# Patient Record
Sex: FEMALE | Race: WHITE | Employment: UNEMPLOYED | URBAN - METROPOLITAN AREA
[De-identification: names, ages, dates, MRNs, and addresses within clinical notes are randomized per-mention and may not be internally consistent; named-entity substitution may affect disease eponyms.]

---

## 2017-09-01 ENCOUNTER — OFFICE VISIT (OUTPATIENT)
Dept: FAMILY MEDICINE CLINIC | Facility: CLINIC | Age: 27
End: 2017-09-01

## 2017-09-01 VITALS
DIASTOLIC BLOOD PRESSURE: 82 MMHG | SYSTOLIC BLOOD PRESSURE: 124 MMHG | HEART RATE: 102 BPM | OXYGEN SATURATION: 98 % | TEMPERATURE: 99 F

## 2017-09-01 DIAGNOSIS — L30.1 DYSHIDROTIC DERMATITIS: Primary | ICD-10-CM

## 2017-09-01 DIAGNOSIS — D22.30 ATYPICAL NEVUS OF FACE: ICD-10-CM

## 2017-09-01 DIAGNOSIS — L74.512 HYPERHIDROSIS OF PALMS AND SOLES: ICD-10-CM

## 2017-09-01 DIAGNOSIS — L74.513 HYPERHIDROSIS OF PALMS AND SOLES: ICD-10-CM

## 2017-09-01 DIAGNOSIS — L71.9 ROSACEA: Chronic | ICD-10-CM

## 2017-09-01 PROCEDURE — 99213 OFFICE O/P EST LOW 20 MIN: CPT | Performed by: FAMILY MEDICINE

## 2017-09-01 RX ORDER — BETAMETHASONE DIPROPIONATE 0.5 MG/G
1 CREAM TOPICAL 2 TIMES DAILY
Qty: 45 G | Refills: 2 | Status: SHIPPED | OUTPATIENT
Start: 2017-09-01 | End: 2019-05-30

## 2017-09-01 RX ORDER — LORATADINE 10 MG/1
TABLET ORAL
Qty: 90 TABLET | Refills: 0 | Status: SHIPPED | OUTPATIENT
Start: 2017-09-01

## 2017-09-01 RX ORDER — DOXYCYCLINE HYCLATE 50 MG/1
50 CAPSULE ORAL 2 TIMES DAILY
Qty: 180 CAPSULE | Refills: 1 | Status: SHIPPED | OUTPATIENT
Start: 2017-09-01 | End: 2018-08-30

## 2017-09-01 RX ORDER — DOXYCYCLINE HYCLATE 50 MG/1
CAPSULE ORAL
COMMUNITY
Start: 2017-06-09 | End: 2017-09-01

## 2017-09-01 NOTE — PROGRESS NOTES
HPI:  Patient presents with:  Rash: B feet for 1 week itchy  Derm Problem: mole lower lip (R)      Mary Victor is a 27year old female who presents with complains of rash of bilateral soles of feet    Duration: 1 week  It is described as itchy, red, palpitations  LUNG:  No SOB, cough or wheeze  GI:  No abdominal pain.   No N/V/D/C  MS:  No joint pain or swelling B  NEURO:  Denies numbness or tingling or weakness  PSYCH:  No mood concerns    EXAM:  /82   Pulse 102   Temp 98.6 °F (37 °C) (Oral)   S dipropionate 0.05 % External Cream 45 g 2      Sig: Apply 1 Application topically 2 (two) times daily.  X 1-2 weeks then prn      loratadine 10 MG Oral Tab 90 tablet 0      Si/2-1 tab po qd      Doxycycline Hyclate 50 MG Oral Cap 180 capsule 1      Sig:

## 2017-09-21 ENCOUNTER — TELEPHONE (OUTPATIENT)
Dept: FAMILY MEDICINE CLINIC | Facility: CLINIC | Age: 27
End: 2017-09-21

## 2017-09-21 NOTE — TELEPHONE ENCOUNTER
LOV 09/01/17 hyperhidrosis of palms and soles. Mom calling, states pt continues to have rash on soles of feet, denies itchiness/pain. States sores appear to be healing; however, she is noticing a \"pitting\" where the blisters were.   Pt used prescribed

## 2017-10-23 NOTE — TELEPHONE ENCOUNTER
Rcvd fax from [facility], requesting refill for:    Norethindrone-Eth Estradiol (nortel 1/35, 28) 1-35 mg-mcg oral tab  Sig: Take 1 tablet PO QD  Qty: 84     LOV 9/1/17, dermatology issue  No future appt schedule   Last pap-unknown

## 2017-12-10 PROBLEM — L70.0 ACNE VULGARIS: Status: ACTIVE | Noted: 2017-12-10

## 2017-12-10 PROBLEM — J30.89 CHRONIC NON-SEASONAL ALLERGIC RHINITIS: Status: ACTIVE | Noted: 2017-12-10

## 2017-12-13 ENCOUNTER — LAB ENCOUNTER (OUTPATIENT)
Dept: LAB | Age: 27
End: 2017-12-13
Attending: FAMILY MEDICINE
Payer: MEDICARE

## 2017-12-13 ENCOUNTER — OFFICE VISIT (OUTPATIENT)
Dept: FAMILY MEDICINE CLINIC | Facility: CLINIC | Age: 27
End: 2017-12-13

## 2017-12-13 VITALS
HEART RATE: 120 BPM | TEMPERATURE: 99 F | DIASTOLIC BLOOD PRESSURE: 78 MMHG | SYSTOLIC BLOOD PRESSURE: 124 MMHG | OXYGEN SATURATION: 98 %

## 2017-12-13 DIAGNOSIS — R23.8 VESICLES: ICD-10-CM

## 2017-12-13 DIAGNOSIS — L74.512 HYPERHIDROSIS OF PALMS AND SOLES: ICD-10-CM

## 2017-12-13 DIAGNOSIS — Z00.00 ROUTINE MEDICAL EXAM: Primary | ICD-10-CM

## 2017-12-13 DIAGNOSIS — E78.5 DYSLIPIDEMIA: ICD-10-CM

## 2017-12-13 DIAGNOSIS — L74.513 HYPERHIDROSIS OF PALMS AND SOLES: ICD-10-CM

## 2017-12-13 DIAGNOSIS — L70.0 ACNE VULGARIS: ICD-10-CM

## 2017-12-13 PROCEDURE — 84443 ASSAY THYROID STIM HORMONE: CPT

## 2017-12-13 PROCEDURE — 80061 LIPID PANEL: CPT

## 2017-12-13 PROCEDURE — 36415 COLL VENOUS BLD VENIPUNCTURE: CPT

## 2017-12-13 PROCEDURE — 86695 HERPES SIMPLEX TYPE 1 TEST: CPT

## 2017-12-13 PROCEDURE — G0438 PPPS, INITIAL VISIT: HCPCS | Performed by: FAMILY MEDICINE

## 2017-12-13 PROCEDURE — G0008 ADMIN INFLUENZA VIRUS VAC: HCPCS | Performed by: FAMILY MEDICINE

## 2017-12-13 PROCEDURE — 80053 COMPREHEN METABOLIC PANEL: CPT

## 2017-12-13 PROCEDURE — 86694 HERPES SIMPLEX NES ANTBDY: CPT

## 2017-12-13 PROCEDURE — 86696 HERPES SIMPLEX TYPE 2 TEST: CPT

## 2017-12-13 PROCEDURE — 85025 COMPLETE CBC W/AUTO DIFF WBC: CPT

## 2017-12-13 PROCEDURE — 90686 IIV4 VACC NO PRSV 0.5 ML IM: CPT | Performed by: FAMILY MEDICINE

## 2017-12-13 NOTE — H&P
HPI:   Patient presents with:  Physical  Derm Problem  Acne      Shanika Welch is a 32year old female who presents for a complete physical exam without pap/gyne exam.     Patient has new complaints of:  No new complaints, overall hyperhidrosis of f positives and negatives noted in the the HPI.  Specifically:  GEN:  No fever or fatigue, no weight gain/loss  HEAD:  No headaches, no dizziness  EYES:  No vision change or complaints  EARS:  No hearing loss, no ear pain  MOUTH/THROAT:  No sore throat or den Discussed importance of exercise and healthy well balanced diet. Recommend low fat DASH diet and aerobic exercise 30 minutes 3-4 times weekly.     Health maintenance:   · At 23 y/o: Thin prep PAP q 2-3 years if h/o NL PAP, deferred  · Recommend B screenin

## 2017-12-18 RX ORDER — VALACYCLOVIR HYDROCHLORIDE 1 G/1
TABLET, FILM COATED ORAL
Qty: 8 TABLET | Refills: 1 | Status: SHIPPED | OUTPATIENT
Start: 2017-12-18 | End: 2018-06-29

## 2018-04-16 RX ORDER — NORETHINDRONE AND ETHINYL ESTRADIOL 1 MG-35MCG
KIT ORAL
Qty: 84 TABLET | Refills: 1 | Status: SHIPPED | OUTPATIENT
Start: 2018-04-16 | End: 2018-09-27

## 2018-04-16 NOTE — TELEPHONE ENCOUNTER
Clemencia David [address]  Requests refill for:    Dasetta 1/35 1-35 mg-mcg oral tab (Pirmella 1/35 tab)  Sig: Take 1 tablet PO QD  Qty 84  RF 1    LOV 12/13/17 CPE w/o gyne; f/u 1-2 years CPE  PAP deferred at this visit

## 2018-06-28 ENCOUNTER — TELEPHONE (OUTPATIENT)
Dept: FAMILY MEDICINE CLINIC | Facility: CLINIC | Age: 28
End: 2018-06-28

## 2018-06-28 RX ORDER — VALACYCLOVIR HYDROCHLORIDE 1 G/1
TABLET, FILM COATED ORAL
Qty: 8 TABLET | Refills: 1 | Status: CANCELLED | OUTPATIENT
Start: 2018-06-28

## 2018-06-28 NOTE — TELEPHONE ENCOUNTER
Copy and pasted from mom's chart Comfort Saini):    \"Good evening Dr. cordova  this is Comfort Saini I’m reaching out to you because Brittney Sarkar has a really bad outbreak on her feet again from her herp Virus.  I filled her valtex  which was prescribed at tw

## 2018-06-29 ENCOUNTER — TELEPHONE (OUTPATIENT)
Dept: FAMILY MEDICINE CLINIC | Facility: CLINIC | Age: 28
End: 2018-06-29

## 2018-06-29 RX ORDER — VALACYCLOVIR HYDROCHLORIDE 1 G/1
TABLET, FILM COATED ORAL
Qty: 8 TABLET | Refills: 1 | Status: SHIPPED | OUTPATIENT
Start: 2018-06-29 | End: 2019-01-18

## 2018-06-29 NOTE — TELEPHONE ENCOUNTER
Spoke to mom, states pt was given valacyclovir 1 tab on 6/25/18 and 6/26/18; pt's breakout has improved but continues to have areas which have not cleared. Would like to know if she should have an additional dose?   Mom made an appt to come in to be evaluat

## 2018-08-30 DIAGNOSIS — L71.9 ROSACEA: Chronic | ICD-10-CM

## 2018-08-30 RX ORDER — DOXYCYCLINE HYCLATE 50 MG/1
CAPSULE ORAL
Qty: 180 CAPSULE | Refills: 0 | Status: SHIPPED | OUTPATIENT
Start: 2018-08-30 | End: 2019-01-18

## 2018-08-30 NOTE — TELEPHONE ENCOUNTER
A refill request was received for:    Pending Prescriptions Disp Refills    DOXYCYCLINE HYCLATE 50 MG Oral Cap [Pharmacy Med Name: DOXYCYCLINE HYC 50MG CAP] 180 capsule 1     Sig: TAKE ONE CAPSULE BY MOUTH TWICE DAILY         Last refill date: 9/1/17  Qty:

## 2018-09-28 RX ORDER — NORETHINDRONE AND ETHINYL ESTRADIOL 1 MG-35MCG
KIT ORAL
Qty: 84 TABLET | Refills: 0 | Status: SHIPPED | OUTPATIENT
Start: 2018-09-28 | End: 2018-12-13

## 2018-12-13 RX ORDER — NORETHINDRONE AND ETHINYL ESTRADIOL 1 MG-35MCG
KIT ORAL
Qty: 84 TABLET | Refills: 1 | Status: SHIPPED | OUTPATIENT
Start: 2018-12-13 | End: 2019-01-18

## 2018-12-13 NOTE — TELEPHONE ENCOUNTER
A refill request was received for:  Requested Prescriptions     Pending Prescriptions Disp Refills   • [facility] 1/35 1-35 MG-MCG Oral Tab [Pharmacy Med Name: [facility] 1/35 TAB] 84 tablet 0     Sig: TAKE 1 TABLET BY MOUTH ONCE DAILY     Last refill date: 9/

## 2019-01-18 ENCOUNTER — OFFICE VISIT (OUTPATIENT)
Dept: FAMILY MEDICINE CLINIC | Facility: CLINIC | Age: 29
End: 2019-01-18
Payer: MEDICARE

## 2019-01-18 ENCOUNTER — LAB ENCOUNTER (OUTPATIENT)
Dept: LAB | Facility: REFERENCE LAB | Age: 29
End: 2019-01-18
Attending: FAMILY MEDICINE
Payer: MEDICARE

## 2019-01-18 VITALS
RESPIRATION RATE: 17 BRPM | DIASTOLIC BLOOD PRESSURE: 62 MMHG | SYSTOLIC BLOOD PRESSURE: 108 MMHG | OXYGEN SATURATION: 98 % | HEART RATE: 115 BPM | WEIGHT: 93.19 LBS

## 2019-01-18 DIAGNOSIS — L74.513 HYPERHIDROSIS OF PALMS AND SOLES: ICD-10-CM

## 2019-01-18 DIAGNOSIS — L71.9 ROSACEA: ICD-10-CM

## 2019-01-18 DIAGNOSIS — Z00.00 ROUTINE MEDICAL EXAM: Primary | ICD-10-CM

## 2019-01-18 DIAGNOSIS — E78.5 DYSLIPIDEMIA: ICD-10-CM

## 2019-01-18 DIAGNOSIS — B00.9 RECURRENT HERPES SIMPLEX: ICD-10-CM

## 2019-01-18 DIAGNOSIS — G80.2 SPASTIC HEMIPLEGIC CEREBRAL PALSY (HCC): ICD-10-CM

## 2019-01-18 DIAGNOSIS — L74.512 HYPERHIDROSIS OF PALMS AND SOLES: ICD-10-CM

## 2019-01-18 LAB
ALBUMIN SERPL BCP-MCNC: 4.4 G/DL (ref 3.5–4.8)
ALBUMIN/GLOB SERPL: 1.3 {RATIO} (ref 1–2)
ALP SERPL-CCNC: 41 U/L (ref 32–100)
ALT SERPL-CCNC: 23 U/L (ref 14–54)
ANION GAP SERPL CALC-SCNC: 14 MMOL/L (ref 0–18)
AST SERPL-CCNC: 23 U/L (ref 15–41)
BASOPHILS # BLD: 0 K/UL (ref 0–0.2)
BASOPHILS NFR BLD: 1 %
BILIRUB SERPL-MCNC: 0.7 MG/DL (ref 0.3–1.2)
BUN SERPL-MCNC: 7 MG/DL (ref 8–20)
BUN/CREAT SERPL: 14 (ref 10–20)
CALCIUM SERPL-MCNC: 9.3 MG/DL (ref 8.5–10.5)
CHLORIDE SERPL-SCNC: 103 MMOL/L (ref 95–110)
CHOLEST SERPL-MCNC: 255 MG/DL (ref 110–200)
CO2 SERPL-SCNC: 19 MMOL/L (ref 22–32)
CREAT SERPL-MCNC: 0.5 MG/DL (ref 0.5–1.5)
EOSINOPHIL # BLD: 0.1 K/UL (ref 0–0.7)
EOSINOPHIL NFR BLD: 2 %
ERYTHROCYTE [DISTWIDTH] IN BLOOD BY AUTOMATED COUNT: 12.8 % (ref 11–15)
GLOBULIN PLAS-MCNC: 3.4 G/DL (ref 2.5–3.7)
GLUCOSE SERPL-MCNC: 83 MG/DL (ref 70–99)
HCT VFR BLD AUTO: 43.2 % (ref 35–48)
HDLC SERPL-MCNC: 67 MG/DL
HGB BLD-MCNC: 14.8 G/DL (ref 12–16)
LDLC SERPL CALC-MCNC: 168 MG/DL (ref 0–99)
LYMPHOCYTES # BLD: 2.1 K/UL (ref 1–4)
LYMPHOCYTES NFR BLD: 32 %
MCH RBC QN AUTO: 29.8 PG (ref 27–32)
MCHC RBC AUTO-ENTMCNC: 34.2 G/DL (ref 32–37)
MCV RBC AUTO: 87.1 FL (ref 80–100)
MONOCYTES # BLD: 0.4 K/UL (ref 0–1)
MONOCYTES NFR BLD: 6 %
NEUTROPHILS # BLD AUTO: 3.9 K/UL (ref 1.8–7.7)
NEUTROPHILS NFR BLD: 59 %
NONHDLC SERPL-MCNC: 188 MG/DL
OSMOLALITY UR CALC.SUM OF ELEC: 279 MOSM/KG (ref 275–295)
PATIENT FASTING: YES
PLATELET # BLD AUTO: 383 K/UL (ref 140–400)
PMV BLD AUTO: 8.2 FL (ref 7.4–10.3)
POTASSIUM SERPL-SCNC: 4.2 MMOL/L (ref 3.3–5.1)
PROT SERPL-MCNC: 7.8 G/DL (ref 5.9–8.4)
RBC # BLD AUTO: 4.96 M/UL (ref 3.7–5.4)
SODIUM SERPL-SCNC: 136 MMOL/L (ref 136–144)
TRIGL SERPL-MCNC: 98 MG/DL (ref 1–149)
WBC # BLD AUTO: 6.6 K/UL (ref 4–11)

## 2019-01-18 PROCEDURE — 90686 IIV4 VACC NO PRSV 0.5 ML IM: CPT | Performed by: FAMILY MEDICINE

## 2019-01-18 PROCEDURE — 85025 COMPLETE CBC W/AUTO DIFF WBC: CPT

## 2019-01-18 PROCEDURE — 36415 COLL VENOUS BLD VENIPUNCTURE: CPT

## 2019-01-18 PROCEDURE — 90472 IMMUNIZATION ADMIN EACH ADD: CPT | Performed by: FAMILY MEDICINE

## 2019-01-18 PROCEDURE — 90715 TDAP VACCINE 7 YRS/> IM: CPT | Performed by: FAMILY MEDICINE

## 2019-01-18 PROCEDURE — G0439 PPPS, SUBSEQ VISIT: HCPCS | Performed by: FAMILY MEDICINE

## 2019-01-18 PROCEDURE — 80061 LIPID PANEL: CPT

## 2019-01-18 PROCEDURE — G0008 ADMIN INFLUENZA VIRUS VAC: HCPCS | Performed by: FAMILY MEDICINE

## 2019-01-18 PROCEDURE — 80053 COMPREHEN METABOLIC PANEL: CPT

## 2019-01-18 RX ORDER — VALACYCLOVIR HYDROCHLORIDE 1 G/1
TABLET, FILM COATED ORAL
Qty: 8 TABLET | Refills: 2 | Status: SHIPPED | OUTPATIENT
Start: 2019-01-18 | End: 2020-07-22

## 2019-01-18 RX ORDER — DOXYCYCLINE HYCLATE 50 MG/1
50 CAPSULE ORAL 2 TIMES DAILY
Qty: 180 CAPSULE | Refills: 0 | Status: SHIPPED | OUTPATIENT
Start: 2019-01-18 | End: 2019-07-30

## 2019-01-18 NOTE — H&P
HPI:   Patient presents with:  Physical: Annual Wellness Visit  Derm Problem      Lisbet Chandler is a 25year old female who presents for a complete physical exam without pap/gyne exam.     Patient has new complaints of:  · none    She denies CP, HAs prn Disp: 45 g Rfl: 2      Past Medical History:   Diagnosis Date   • Cerebral palsy ([facility] [address]    • Seasonal allergies       History reviewed. No pertinent surgical history. History reviewed. No pertinent family history.   No Known Allergies   Social History: no rales or rhonchi B   CARDIO: RRR without murmur, NL S1 S2, no S3 S4  GI: NABS, soft, nodistended, no masses, no HSM or tenderness  EXTREMITIES: no CCE,  brachial, DP, PT pulses wnl B  MS: In wheelchair, no change since last exam  PSYCH: mood and affect Refill: 2    5. Spastic hemiplegic cerebral palsy (HCC)  Stable    6. Dyslipidemia  Not on medication, recheck lipids, follow-up pending results  - CBC WITH DIFFERENTIAL WITH PLATELET; Future  - COMP METABOLIC PANEL (14); Future  - LIPID PANEL;  Future

## 2019-05-30 DIAGNOSIS — L30.1 DYSHIDROTIC DERMATITIS: ICD-10-CM

## 2019-05-30 DIAGNOSIS — L74.513 HYPERHIDROSIS OF PALMS AND SOLES: ICD-10-CM

## 2019-05-30 DIAGNOSIS — L74.512 HYPERHIDROSIS OF PALMS AND SOLES: ICD-10-CM

## 2019-05-30 RX ORDER — BETAMETHASONE DIPROPIONATE 0.5 MG/G
CREAM TOPICAL
Qty: 45 G | Refills: 1 | Status: SHIPPED | OUTPATIENT
Start: 2019-05-30

## 2019-05-30 NOTE — TELEPHONE ENCOUNTER
A refill request was received for:  Requested Prescriptions     Pending Prescriptions Disp Refills   • BETAMETHASONE DIPROPIONATE 0.05 % External Cream [Pharmacy Med Name: Geoffrey Cavazos DIP 0.05%  CRE]  2     Sig: APPLY  CREAM TOPICALLY TO AFFECTED AREA TWICE D

## 2019-07-30 DIAGNOSIS — L71.9 ROSACEA: ICD-10-CM

## 2019-07-30 NOTE — TELEPHONE ENCOUNTER
A refill request was received for:  Requested Prescriptions     Pending Prescriptions Disp Refills   • DOXYCYCLINE HYCLATE 50 MG Oral Cap [Pharmacy Med Name: DOXYCYCLINE HYCLATE 50MG CAP] 180 capsule 0     Sig: TAKE 1 CAPSULE BY MOUTH TWICE DAILY     Last

## 2019-08-01 RX ORDER — DOXYCYCLINE HYCLATE 50 MG/1
CAPSULE ORAL
Qty: 180 CAPSULE | Refills: 0 | Status: SHIPPED | OUTPATIENT
Start: 2019-08-01 | End: 2019-11-29

## 2019-09-18 ENCOUNTER — OFFICE VISIT (OUTPATIENT)
Dept: FAMILY MEDICINE CLINIC | Facility: CLINIC | Age: 29
End: 2019-09-18
Payer: MEDICARE

## 2019-09-18 VITALS — TEMPERATURE: 98 F | HEART RATE: 103 BPM | DIASTOLIC BLOOD PRESSURE: 82 MMHG | SYSTOLIC BLOOD PRESSURE: 126 MMHG

## 2019-09-18 DIAGNOSIS — G80.2 SPASTIC HEMIPLEGIC CEREBRAL PALSY (HCC): ICD-10-CM

## 2019-09-18 DIAGNOSIS — L74.512 HYPERHIDROSIS OF PALMS AND SOLES: ICD-10-CM

## 2019-09-18 DIAGNOSIS — L74.513 HYPERHIDROSIS OF PALMS AND SOLES: ICD-10-CM

## 2019-09-18 DIAGNOSIS — L71.9 ROSACEA: Primary | Chronic | ICD-10-CM

## 2019-09-18 DIAGNOSIS — L70.0 ACNE VULGARIS: ICD-10-CM

## 2019-09-18 DIAGNOSIS — D22.9 MULTIPLE BENIGN NEVI: ICD-10-CM

## 2019-09-18 DIAGNOSIS — L30.1 DYSHIDROTIC DERMATITIS: ICD-10-CM

## 2019-09-18 PROCEDURE — 99214 OFFICE O/P EST MOD 30 MIN: CPT | Performed by: FAMILY MEDICINE

## 2019-09-18 NOTE — PROGRESS NOTES
HPI:   Patient presents with:  Cerebral Palsy  Rosacea  Follow - Up  Derm Problem: mole milton Victor is a 30year old female. She primarily presents for:  Acne rosacea follow-up/  Location(s): Face. Has had for years.   Acne is improvin Medications:  DOXYCYCLINE HYCLATE 50 MG Oral Cap TAKE 1 CAPSULE BY MOUTH TWICE DAILY Disp: 180 capsule Rfl: 0   ValACYclovir HCl 1 G Oral Tab 2 tabs po bid x 1-2 days Disp: 8 tablet Rfl: 2   Norethindrone-Eth Estradiol (PIRMELLA 1/35) 1-35 MG-MCG Oral Tab PERRLA, EOMI,conjunctiva clear, no discharge B  NECK: supple, no LAD, no TM  LUNGS: good BS B, clear to auscultation B, no wheezing, no rhonchi or rales B   CARDIO: RRR without murmur, NL S1 S2, no S3 S4  EXT: no CCE  GI: NABS, soft, nondistended, no tende

## 2019-11-29 DIAGNOSIS — L71.9 ROSACEA: ICD-10-CM

## 2019-11-29 RX ORDER — DOXYCYCLINE HYCLATE 50 MG/1
CAPSULE ORAL
Qty: 180 CAPSULE | Refills: 0 | Status: SHIPPED | OUTPATIENT
Start: 2019-11-29 | End: 2020-07-20

## 2020-02-03 DIAGNOSIS — Z00.00 ROUTINE MEDICAL EXAM: ICD-10-CM

## 2020-02-03 RX ORDER — NORETHINDRONE AND ETHINYL ESTRADIOL 1 MG-35MCG
KIT ORAL
Qty: 84 TABLET | Refills: 0 | Status: SHIPPED | OUTPATIENT
Start: 2020-02-03 | End: 2020-04-24

## 2020-02-24 ENCOUNTER — TELEPHONE (OUTPATIENT)
Dept: FAMILY MEDICINE CLINIC | Facility: CLINIC | Age: 30
End: 2020-02-24

## 2020-02-24 NOTE — TELEPHONE ENCOUNTER
Called pt in order to schedule a medicare annual paolo. Pt didnt have time to talk. She will call us back.

## 2020-04-22 ENCOUNTER — TELEPHONE (OUTPATIENT)
Dept: FAMILY MEDICINE CLINIC | Facility: CLINIC | Age: 30
End: 2020-04-22

## 2020-04-24 DIAGNOSIS — Z00.00 ROUTINE MEDICAL EXAM: ICD-10-CM

## 2020-04-24 RX ORDER — NORETHINDRONE AND ETHINYL ESTRADIOL 1 MG-35MCG
KIT ORAL
Qty: 84 TABLET | Refills: 0 | Status: SHIPPED | OUTPATIENT
Start: 2020-04-24 | End: 2020-06-02

## 2020-04-24 NOTE — TELEPHONE ENCOUNTER
A refill request was received for:  Requested Prescriptions     Pending Prescriptions Disp Refills   • [facility] 1/35 1-35 MG-MCG Oral Tab [Pharmacy Med Name: Janina Kong 1/35 1-35 MG-MCG Oral Tablet] 84 tablet 0     Sig: Take 1 tablet by mouth once daily

## 2020-04-24 NOTE — TELEPHONE ENCOUNTER
Spoke to pts mother, Marianna Ramsey, to schedule a visit. Marianna Ramsey declined due to her current situation and not having time to complete a visit.

## 2020-05-28 DIAGNOSIS — Z00.00 ROUTINE MEDICAL EXAM: ICD-10-CM

## 2020-05-28 DIAGNOSIS — B00.9 RECURRENT HERPES SIMPLEX: ICD-10-CM

## 2020-05-28 NOTE — TELEPHONE ENCOUNTER
Called patient and she said she will call back tomorrow to schedule appointment; she wants to look at her calendar and was driving at the time.

## 2020-05-28 NOTE — TELEPHONE ENCOUNTER
Patient due for an annual wellness visit and 6-month follow-up as of March 2020. Please schedule her before we can fill her medication. Thanks!

## 2020-06-02 DIAGNOSIS — Z00.00 ROUTINE MEDICAL EXAM: ICD-10-CM

## 2020-06-02 NOTE — TELEPHONE ENCOUNTER
A refill request was received for:  Requested Prescriptions     Pending Prescriptions Disp Refills   • Norethindrone-Eth Estradiol (PIRMELLA 1/35) 1-35 MG-MCG Oral Tab 84 tablet 0     Sig: Take 1 tablet by mouth daily.      Last refill date: 4/24/2020  Qty:

## 2020-06-03 NOTE — TELEPHONE ENCOUNTER
Call pt's mother, Christina Vigil I sent in RF(s) for her OCPand remind that she is due for follow up with me: In September  I am booking out 4-6 weeks so make appointment now if able. Thanks!

## 2020-06-05 ENCOUNTER — TELEPHONE (OUTPATIENT)
Dept: FAMILY MEDICINE CLINIC | Facility: CLINIC | Age: 30
End: 2020-06-05

## 2020-07-20 DIAGNOSIS — L71.9 ROSACEA: ICD-10-CM

## 2020-07-20 RX ORDER — DOXYCYCLINE HYCLATE 50 MG/1
CAPSULE ORAL
Qty: 180 CAPSULE | Refills: 0 | Status: SHIPPED | OUTPATIENT
Start: 2020-07-20 | End: 2021-02-09

## 2020-07-22 RX ORDER — NORETHINDRONE AND ETHINYL ESTRADIOL 1 MG-35MCG
KIT ORAL
Qty: 84 TABLET | Refills: 0 | OUTPATIENT
Start: 2020-07-22

## 2020-07-22 RX ORDER — VALACYCLOVIR HYDROCHLORIDE 1 G/1
TABLET, FILM COATED ORAL
Qty: 8 TABLET | Refills: 0 | Status: SHIPPED
Start: 2020-07-22 | End: 2021-12-23

## 2020-08-25 ENCOUNTER — LAB ENCOUNTER (OUTPATIENT)
Dept: LAB | Facility: REFERENCE LAB | Age: 30
End: 2020-08-25
Attending: FAMILY MEDICINE
Payer: MEDICARE

## 2020-08-25 ENCOUNTER — OFFICE VISIT (OUTPATIENT)
Dept: FAMILY MEDICINE CLINIC | Facility: CLINIC | Age: 30
End: 2020-08-25
Payer: MEDICARE

## 2020-08-25 VITALS
SYSTOLIC BLOOD PRESSURE: 118 MMHG | DIASTOLIC BLOOD PRESSURE: 84 MMHG | WEIGHT: 82.38 LBS | HEART RATE: 75 BPM | OXYGEN SATURATION: 98 %

## 2020-08-25 DIAGNOSIS — G80.2 SPASTIC HEMIPLEGIC CEREBRAL PALSY (HCC): ICD-10-CM

## 2020-08-25 DIAGNOSIS — L74.513 HYPERHIDROSIS OF PALMS AND SOLES: ICD-10-CM

## 2020-08-25 DIAGNOSIS — Z00.00 ROUTINE MEDICAL EXAM: Primary | ICD-10-CM

## 2020-08-25 DIAGNOSIS — L74.512 HYPERHIDROSIS OF PALMS AND SOLES: ICD-10-CM

## 2020-08-25 DIAGNOSIS — E78.5 DYSLIPIDEMIA: ICD-10-CM

## 2020-08-25 DIAGNOSIS — L71.9 ROSACEA, ACNE: ICD-10-CM

## 2020-08-25 LAB
ALBUMIN SERPL-MCNC: 4 G/DL (ref 3.4–5)
ALBUMIN/GLOB SERPL: 1 {RATIO} (ref 1–2)
ALP LIVER SERPL-CCNC: 36 U/L (ref 37–98)
ALT SERPL-CCNC: 35 U/L (ref 13–56)
ANION GAP SERPL CALC-SCNC: 8 MMOL/L (ref 0–18)
AST SERPL-CCNC: 19 U/L (ref 15–37)
BASOPHILS # BLD AUTO: 0.06 X10(3) UL (ref 0–0.2)
BASOPHILS NFR BLD AUTO: 0.6 %
BILIRUB SERPL-MCNC: 0.7 MG/DL (ref 0.1–2)
BUN BLD-MCNC: 9 MG/DL (ref 7–18)
BUN/CREAT SERPL: 13.6 (ref 10–20)
CALCIUM BLD-MCNC: 9.4 MG/DL (ref 8.5–10.1)
CHLORIDE SERPL-SCNC: 108 MMOL/L (ref 98–112)
CHOLEST SMN-MCNC: 213 MG/DL (ref ?–200)
CO2 SERPL-SCNC: 21 MMOL/L (ref 21–32)
CREAT BLD-MCNC: 0.66 MG/DL (ref 0.55–1.02)
DEPRECATED RDW RBC AUTO: 39.8 FL (ref 35.1–46.3)
EOSINOPHIL # BLD AUTO: 0.09 X10(3) UL (ref 0–0.7)
EOSINOPHIL NFR BLD AUTO: 0.9 %
ERYTHROCYTE [DISTWIDTH] IN BLOOD BY AUTOMATED COUNT: 12.4 % (ref 11–15)
GLOBULIN PLAS-MCNC: 4 G/DL (ref 2.8–4.4)
GLUCOSE BLD-MCNC: 80 MG/DL (ref 70–99)
HCT VFR BLD AUTO: 38.5 % (ref 35–48)
HDLC SERPL-MCNC: 63 MG/DL (ref 40–59)
HGB BLD-MCNC: 13.3 G/DL (ref 12–16)
IMM GRANULOCYTES # BLD AUTO: 0.03 X10(3) UL (ref 0–1)
IMM GRANULOCYTES NFR BLD: 0.3 %
LDLC SERPL CALC-MCNC: 127 MG/DL (ref ?–100)
LYMPHOCYTES # BLD AUTO: 2.38 X10(3) UL (ref 1–4)
LYMPHOCYTES NFR BLD AUTO: 22.8 %
M PROTEIN MFR SERPL ELPH: 8 G/DL (ref 6.4–8.2)
MCH RBC QN AUTO: 30.2 PG (ref 26–34)
MCHC RBC AUTO-ENTMCNC: 34.5 G/DL (ref 31–37)
MCV RBC AUTO: 87.5 FL (ref 80–100)
MONOCYTES # BLD AUTO: 0.48 X10(3) UL (ref 0.1–1)
MONOCYTES NFR BLD AUTO: 4.6 %
NEUTROPHILS # BLD AUTO: 7.39 X10 (3) UL (ref 1.5–7.7)
NEUTROPHILS # BLD AUTO: 7.39 X10(3) UL (ref 1.5–7.7)
NEUTROPHILS NFR BLD AUTO: 70.8 %
NONHDLC SERPL-MCNC: 150 MG/DL (ref ?–130)
OSMOLALITY SERPL CALC.SUM OF ELEC: 282 MOSM/KG (ref 275–295)
PATIENT FASTING Y/N/NP: YES
PATIENT FASTING Y/N/NP: YES
PLATELET # BLD AUTO: 416 10(3)UL (ref 150–450)
POTASSIUM SERPL-SCNC: 3.4 MMOL/L (ref 3.5–5.1)
RBC # BLD AUTO: 4.4 X10(6)UL (ref 3.8–5.3)
SODIUM SERPL-SCNC: 137 MMOL/L (ref 136–145)
TRIGL SERPL-MCNC: 113 MG/DL (ref 30–149)
TSI SER-ACNC: 1 MIU/ML (ref 0.36–3.74)
VLDLC SERPL CALC-MCNC: 23 MG/DL (ref 0–30)
WBC # BLD AUTO: 10.4 X10(3) UL (ref 4–11)

## 2020-08-25 PROCEDURE — 85025 COMPLETE CBC W/AUTO DIFF WBC: CPT

## 2020-08-25 PROCEDURE — 80061 LIPID PANEL: CPT

## 2020-08-25 PROCEDURE — 36415 COLL VENOUS BLD VENIPUNCTURE: CPT

## 2020-08-25 PROCEDURE — 80053 COMPREHEN METABOLIC PANEL: CPT

## 2020-08-25 PROCEDURE — 84443 ASSAY THYROID STIM HORMONE: CPT

## 2020-08-25 PROCEDURE — G0439 PPPS, SUBSEQ VISIT: HCPCS | Performed by: FAMILY MEDICINE

## 2020-08-25 NOTE — H&P
HPI:   Patient presents with:  Physical      Milli Saravanan is a 30year old female who presents for a complete physical exam without pap/gyne exam.     Patient has new complaints of:  · none    She denies CP, HAs,SOB, Dizziness, Palpitations, Weight History:   Diagnosis Date   • Cerebral palsy ([facility] [address]    • Seasonal allergies       History reviewed. No pertinent surgical history. History reviewed. No pertinent family history.   No Known Allergies   Social History:  Social History    Socioeconomic Histor soft, nodistended, no masses, no HSM or tenderness  EXTREMITIES: no CCE,  brachial, DP, PT pulses wnl B  MS: In wheelchair, no change since last exam  PSYCH: mood and affect WNL, speech and thought congruent  NEURO: A&O x 3, CN II-XII intact, no change sin agrees to the above plan.   Follow up: as above    Orders Placed This Encounter      CBC      CMP      LIPID PANEL      TSH W Reflex To Free T4      Meds & Refills for this Visit:  Requested Prescriptions      No prescriptions requested or ordered in this e

## 2020-08-28 NOTE — PROGRESS NOTES
Jason Pratt,    Below are the results of your recently performed labs/tests: All results are essentially stable or within NORMAL limits for you. Please:   Recheck as below:   In 1 year with your next annual physical exam.    Take care,     M

## 2020-08-31 ENCOUNTER — TELEPHONE (OUTPATIENT)
Dept: FAMILY MEDICINE CLINIC | Facility: CLINIC | Age: 30
End: 2020-08-31

## 2020-08-31 NOTE — TELEPHONE ENCOUNTER
Dr. Luis Cr reviewed these results and states they are normal, to be repeated in 1 year. Letter with results mailed to patient's home. Please inform her they were normal and she will be receiving them in the mail soon.

## 2021-02-09 ENCOUNTER — TELEPHONE (OUTPATIENT)
Dept: FAMILY MEDICINE CLINIC | Facility: CLINIC | Age: 31
End: 2021-02-09

## 2021-02-09 DIAGNOSIS — L71.9 ROSACEA: ICD-10-CM

## 2021-02-09 RX ORDER — DOXYCYCLINE HYCLATE 50 MG/1
50 CAPSULE ORAL 2 TIMES DAILY
Qty: 180 CAPSULE | Refills: 0 | Status: SHIPPED | OUTPATIENT
Start: 2021-02-09 | End: 2021-02-22

## 2021-02-09 NOTE — TELEPHONE ENCOUNTER
A refill request was received for:  Requested Prescriptions     Pending Prescriptions Disp Refills   • Doxycycline Hyclate 50 MG Oral Cap 180 capsule 0     Sig: Take 1 capsule (50 mg total) by mouth 2 (two) times daily.      Last refill date:7/20/20   Qty:1

## 2021-02-22 RX ORDER — DOXYCYCLINE HYCLATE 50 MG/1
50 CAPSULE ORAL 2 TIMES DAILY
Qty: 180 CAPSULE | Refills: 0 | Status: SHIPPED | OUTPATIENT
Start: 2021-02-22 | End: 2021-07-06

## 2021-02-22 NOTE — TELEPHONE ENCOUNTER
Rx needing to be sent to  [address] [place] [address] instead of [facility].  I changed pt pharmacy to the [address] [place] [address]

## 2021-07-06 DIAGNOSIS — L71.9 ROSACEA: ICD-10-CM

## 2021-07-06 DIAGNOSIS — Z00.00 ROUTINE MEDICAL EXAM: ICD-10-CM

## 2021-07-06 RX ORDER — NORETHINDRONE AND ETHINYL ESTRADIOL 1 MG-35MCG
KIT ORAL
Qty: 84 TABLET | Refills: 0 | Status: SHIPPED | OUTPATIENT
Start: 2021-07-06 | End: 2021-09-28

## 2021-07-06 RX ORDER — DOXYCYCLINE HYCLATE 50 MG/1
CAPSULE ORAL
Qty: 180 CAPSULE | Refills: 0 | Status: SHIPPED | OUTPATIENT
Start: 2021-07-06 | End: 2021-11-10

## 2021-08-08 ENCOUNTER — TELEPHONE (OUTPATIENT)
Dept: FAMILY MEDICINE CLINIC | Facility: CLINIC | Age: 31
End: 2021-08-08

## 2021-08-08 NOTE — TELEPHONE ENCOUNTER
Spoke with patient's mother. She reports patient had mild left ear pain yesterday, today pain is worse. No fever or other URI symptoms. Mom is unsure if symptoms are allergies vs infection.  Advised immediate care for ear check, mom does not want to go toda

## 2021-08-19 ENCOUNTER — TELEPHONE (OUTPATIENT)
Dept: FAMILY MEDICINE CLINIC | Facility: CLINIC | Age: 31
End: 2021-08-19

## 2021-09-26 DIAGNOSIS — Z00.00 ROUTINE MEDICAL EXAM: ICD-10-CM

## 2021-09-27 NOTE — TELEPHONE ENCOUNTER
A refill request was received for:  Requested Prescriptions     Pending Prescriptions Disp Refills   • [facility] 1/35 1-35 MG-MCG Oral Tab [Pharmacy Med Name: Flaquita Freeman 1/35 1-35 MG-MCG Oral Tablet] 84 tablet 0     Sig: Take 1 tablet by mouth once daily

## 2021-09-28 RX ORDER — NORETHINDRONE AND ETHINYL ESTRADIOL 1 MG-35MCG
KIT ORAL
Qty: 84 TABLET | Refills: 0 | Status: SHIPPED | OUTPATIENT
Start: 2021-09-28

## 2021-09-28 NOTE — TELEPHONE ENCOUNTER
Received call from pt's mother requesting refill of OCP. Discussed pt is overdue for annual visit. We need appt scheduled in order to provide refill. Agreeable to appt 10/29. Mom requesting virtual visit as they live 2 hours away.  Advised I would schedule

## 2021-10-29 ENCOUNTER — TELEMEDICINE (OUTPATIENT)
Dept: FAMILY MEDICINE CLINIC | Facility: CLINIC | Age: 31
End: 2021-10-29

## 2021-10-29 DIAGNOSIS — E78.5 DYSLIPIDEMIA: ICD-10-CM

## 2021-10-29 DIAGNOSIS — G80.2 SPASTIC HEMIPLEGIC CEREBRAL PALSY (HCC): ICD-10-CM

## 2021-10-29 DIAGNOSIS — B00.9 RECURRENT HERPES SIMPLEX: ICD-10-CM

## 2021-10-29 DIAGNOSIS — L21.9 SEBORRHEIC DERMATITIS OF SCALP: ICD-10-CM

## 2021-10-29 DIAGNOSIS — L74.513 HYPERHIDROSIS OF PALMS AND SOLES: ICD-10-CM

## 2021-10-29 DIAGNOSIS — L74.512 HYPERHIDROSIS OF PALMS AND SOLES: ICD-10-CM

## 2021-10-29 DIAGNOSIS — L71.9 ACNE ROSACEA: Primary | ICD-10-CM

## 2021-10-29 PROCEDURE — 99214 OFFICE O/P EST MOD 30 MIN: CPT | Performed by: FAMILY MEDICINE

## 2021-10-29 RX ORDER — KETOCONAZOLE 20 MG/ML
SHAMPOO TOPICAL
Qty: 1 EACH | Refills: 2 | Status: SHIPPED | OUTPATIENT
Start: 2021-10-29 | End: 2021-11-29

## 2021-10-30 NOTE — PROGRESS NOTES
Due to the real risk of possible exposure to Coronavirus (CoV-2, COVID-19) in the office/medical building and recommendations for social distancing (key to mitigation/limiting the spread of the virus) a Virtual or Telemedicine visit over the phone was perf in the the HPI-Specifically:  GEN:  No fever or excessive fatigue  HEAD:  No frequent headaches, no dizziness  EYES:  No new vision change or complaints  MOUTH/THROAT:  No sore throat   HEART:  No chest pain or palpitations  LUNG:  No SOB, no persistent co Panel (14)      Lipid Panel      TSH W Reflex To Free T4      Meds & Refills for this Visit:  Requested Prescriptions     Signed Prescriptions Disp Refills   • ketoconazole 2 % External Shampoo 1 each 2     Sig: Apply to scalp, lather thoroughly then rinse

## 2021-11-05 ENCOUNTER — TELEPHONE (OUTPATIENT)
Dept: FAMILY MEDICINE CLINIC | Facility: CLINIC | Age: 31
End: 2021-11-05

## 2021-11-09 DIAGNOSIS — L71.9 ROSACEA: ICD-10-CM

## 2021-11-10 ENCOUNTER — TELEPHONE (OUTPATIENT)
Dept: FAMILY MEDICINE CLINIC | Facility: CLINIC | Age: 31
End: 2021-11-10

## 2021-11-10 RX ORDER — DOXYCYCLINE HYCLATE 50 MG/1
CAPSULE ORAL
Qty: 180 CAPSULE | Refills: 0 | Status: SHIPPED | OUTPATIENT
Start: 2021-11-10 | End: 2021-11-29

## 2021-11-10 NOTE — TELEPHONE ENCOUNTER
Received vm from pt's mom Gunjan, said \"defungal\" shampoo was supposed to be sent to pharm after visit with Dr. Randy Rodriguez and pharm does not have rx. Ph: [phone]    Rx was sent 10/29.

## 2021-11-10 NOTE — TELEPHONE ENCOUNTER
A refill request was received for:  Requested Prescriptions     Pending Prescriptions Disp Refills   • DOXYCYCLINE 50 MG Oral Cap [Pharmacy Med Name: Doxycycline Hyclate 50 MG Oral Capsule] 180 capsule 0     Sig: Take 1 capsule by mouth twice daily     Las

## 2021-11-29 ENCOUNTER — TELEPHONE (OUTPATIENT)
Dept: FAMILY MEDICINE CLINIC | Facility: CLINIC | Age: 31
End: 2021-11-29

## 2021-11-29 DIAGNOSIS — L21.9 SEBORRHEIC DERMATITIS OF SCALP: ICD-10-CM

## 2021-11-29 DIAGNOSIS — L71.9 ROSACEA: ICD-10-CM

## 2021-11-29 RX ORDER — DOXYCYCLINE HYCLATE 50 MG/1
50 CAPSULE ORAL 2 TIMES DAILY
Qty: 180 CAPSULE | Refills: 0 | Status: SHIPPED | OUTPATIENT
Start: 2021-11-29

## 2021-11-29 RX ORDER — KETOCONAZOLE 20 MG/ML
SHAMPOO TOPICAL
Qty: 1 EACH | Refills: 2 | Status: SHIPPED | OUTPATIENT
Start: 2021-11-29

## 2021-11-29 NOTE — TELEPHONE ENCOUNTER
A refill request was received for:  Requested Prescriptions     Pending Prescriptions Disp Refills   • doxycycline 50 MG Oral Cap 180 capsule 0     Sig: Take 1 capsule (50 mg total) by mouth 2 (two) times daily.    • ketoconazole 2 % External Shampoo 1 each

## 2021-12-22 ENCOUNTER — TELEPHONE (OUTPATIENT)
Dept: FAMILY MEDICINE CLINIC | Facility: CLINIC | Age: 31
End: 2021-12-22

## 2021-12-22 DIAGNOSIS — B00.9 RECURRENT HERPES SIMPLEX: ICD-10-CM

## 2021-12-22 RX ORDER — VALACYCLOVIR HYDROCHLORIDE 1 G/1
TABLET, FILM COATED ORAL
Qty: 8 TABLET | Refills: 0 | Status: CANCELLED | OUTPATIENT
Start: 2021-12-22

## 2021-12-22 NOTE — TELEPHONE ENCOUNTER
Received vm from pt's mom requesting 3 mo supply of valtrex 1 g to [facility] in [place], [place].   Gunjan's ph: [phone]      A refill request was received for:  Requested Prescriptions     Pending Prescriptions Disp Refills   • valACYclovir 1 G Oral Tab 8 tablet

## 2021-12-23 RX ORDER — VALACYCLOVIR HYDROCHLORIDE 1 G/1
TABLET, FILM COATED ORAL
Qty: 32 TABLET | Refills: 2 | Status: SHIPPED | OUTPATIENT
Start: 2021-12-23

## 2021-12-23 NOTE — TELEPHONE ENCOUNTER
Spoke to [place], pt mom, stattes it is on her feet really bad flare up. The worst she has had. Pt mom did also state she is taking it just like prescribed. Taking 2 tabs po bid x 1-2 days.     Pt mom wasn't sure if you want her to be taking it a different

## 2021-12-23 NOTE — TELEPHONE ENCOUNTER
I pended Rx-confirm with her mother that this is how she is taking it- PRN for herpes (where?) flare

## 2022-04-06 RX ORDER — DOXYCYCLINE HYCLATE 50 MG/1
CAPSULE ORAL
Qty: 180 CAPSULE | Refills: 0 | Status: SHIPPED | OUTPATIENT
Start: 2022-04-06

## 2022-05-02 RX ORDER — NORETHINDRONE AND ETHINYL ESTRADIOL 1 MG-35MCG
KIT ORAL
Qty: 84 TABLET | Refills: 0 | Status: SHIPPED | OUTPATIENT
Start: 2022-05-02

## 2022-05-02 NOTE — TELEPHONE ENCOUNTER
Requested Prescriptions     Pending Prescriptions Disp Marcus Holter [facility] 1/35 1-35 MG-MCG Oral Tab [Pharmacy Med Name: Mariia Renee 1/35 1-35 MG-MCG Oral Tablet] 84 tablet 0     Sig: Take 1 tablet by mouth once daily     LAST REFILL DATE 09/28/2021   QUANTITY REQUESTED 84   DAY SUPPLY 84   DIAGNOSIS Routine medical exam    LAST OFFICE VISIT  10/29/2021   FOLLOW UP DUE 04/14/2022   No future appointments. *IF THE PATIENT IS OVER DUE FOR A VISIT OR THEY WILL BE DUE DURING THE TIME THE NEW REFILL COVERS, SEND THE ENCOUNTER TO THE PSR TO CALL AND SCHEDULE THE PATIENT. *COMPLETE ALL STEPS BEFORE SENDING TO THE PROVIDER.

## 2022-05-18 ENCOUNTER — TELEPHONE (OUTPATIENT)
Dept: FAMILY MEDICINE CLINIC | Facility: CLINIC | Age: 32
End: 2022-05-18

## 2022-07-24 DIAGNOSIS — Z00.00 ROUTINE MEDICAL EXAM: ICD-10-CM

## 2022-07-24 DIAGNOSIS — L71.9 ROSACEA: ICD-10-CM

## 2022-07-25 RX ORDER — DOXYCYCLINE HYCLATE 50 MG/1
CAPSULE ORAL
Qty: 180 CAPSULE | Refills: 0 | Status: SHIPPED | OUTPATIENT
Start: 2022-07-25

## 2022-07-25 RX ORDER — NORETHINDRONE AND ETHINYL ESTRADIOL 1 MG-35MCG
KIT ORAL
Qty: 84 TABLET | Refills: 0 | Status: SHIPPED | OUTPATIENT
Start: 2022-07-25

## 2022-07-29 NOTE — TELEPHONE ENCOUNTER
A refill request was received for:  Requested Prescriptions     Pending Prescriptions Disp Refills   • DOXYCYCLINE HYCLATE 50 MG Oral Cap [Pharmacy Med Name: DOXYCYCLINE HYCLATE 50MG CAP] 180 capsule 0     Sig: TAKE 1 CAPSULE BY MOUTH TWICE DAILY     Last Transfer documentation in patients castellanos in room      SHAHRAM Doyle  07/29/22 [address], [facility]  07/29/22 [id number]

## 2022-08-17 ENCOUNTER — TELEPHONE (OUTPATIENT)
Dept: FAMILY MEDICINE CLINIC | Facility: CLINIC | Age: 32
End: 2022-08-17

## 2022-08-17 NOTE — TELEPHONE ENCOUNTER
Our office received a FAX from [facility]- Abdomen Pelvis CT w/wo contrast. I placed it on Dr Steve harden for review.

## 2022-10-10 DIAGNOSIS — Z00.00 ROUTINE MEDICAL EXAM: ICD-10-CM

## 2022-10-11 RX ORDER — NORETHINDRONE AND ETHINYL ESTRADIOL 1 MG-35MCG
KIT ORAL
Qty: 84 TABLET | Refills: 0 | Status: SHIPPED | OUTPATIENT
Start: 2022-10-11

## 2022-10-11 NOTE — TELEPHONE ENCOUNTER
Requested Prescriptions     Pending Prescriptions Disp Tanner Yarbrough [facility] 1/35 1-35 MG-MCG Oral Tab [Pharmacy Med Name: Benjamin Sanders 1/35 1-35 MG-MCG Oral Tablet] 84 tablet 0     Sig: Take 1 tablet by mouth once daily     LAST REFILL DATE 07/25/2022   QUANTITY REQUESTED 84   DAY SUPPLY 84   DIAGNOSIS Routine medical exam    LAST OFFICE VISIT  05/04/2022   FOLLOW UP DUE 11/14/2022   No future appointments. *IF THE PATIENT IS OVER DUE FOR A VISIT OR THEY WILL BE DUE DURING THE TIME THE NEW REFILL COVERS, SEND THE ENCOUNTER TO THE PSR TO CALL AND SCHEDULE THE PATIENT. *COMPLETE ALL STEPS BEFORE SENDING TO THE PROVIDER.

## 2022-12-06 ENCOUNTER — TELEPHONE (OUTPATIENT)
Dept: FAMILY MEDICINE CLINIC | Facility: CLINIC | Age: 32
End: 2022-12-06

## 2022-12-06 DIAGNOSIS — L71.9 ROSACEA: ICD-10-CM

## 2022-12-06 RX ORDER — DOXYCYCLINE HYCLATE 50 MG/1
50 CAPSULE ORAL 2 TIMES DAILY
Qty: 180 CAPSULE | Refills: 0 | OUTPATIENT
Start: 2022-12-06

## 2023-01-17 DIAGNOSIS — N94.6 DYSMENORRHEA: Primary | ICD-10-CM

## 2023-01-17 DIAGNOSIS — Z00.00 ROUTINE MEDICAL EXAM: ICD-10-CM

## 2023-01-17 NOTE — TELEPHONE ENCOUNTER
Please contact pt for appt. on rx refill Frantz 1/35. Pt last seen by Nemo Malloy Ra in October of 2022. Thank you. A refill request was received for:  Requested Prescriptions      No prescriptions requested or ordered in this encounter     Last refill date:  10/11/2022   Qty: 84  Dx:   Last office visit: 5/4/2022 Telemedicine    When is follow up due: No appt scheduled      No future appointments.

## 2023-01-17 NOTE — TELEPHONE ENCOUNTER
Received voicemail from patient's mother regarding medication. Patient needs refill of Primella. Patient scheduled for follow up 1/28/22 with Kortney Gasca. Will send refill request to Kortney Gasca.

## 2023-01-18 RX ORDER — NORETHINDRONE AND ETHINYL ESTRADIOL 1 MG-35MCG
1 KIT ORAL DAILY
Qty: 84 TABLET | Refills: 0 | Status: SHIPPED | OUTPATIENT
Start: 2023-01-18

## 2023-01-28 ENCOUNTER — TELEMEDICINE (OUTPATIENT)
Dept: FAMILY MEDICINE CLINIC | Facility: CLINIC | Age: 33
End: 2023-01-28
Payer: MEDICARE

## 2023-01-28 DIAGNOSIS — L71.9 ROSACEA: ICD-10-CM

## 2023-01-28 DIAGNOSIS — N94.6 DYSMENORRHEA: ICD-10-CM

## 2023-01-28 DIAGNOSIS — L42 PITYRIASIS ROSEA: ICD-10-CM

## 2023-01-28 DIAGNOSIS — Z30.41 ENCOUNTER FOR SURVEILLANCE OF CONTRACEPTIVE PILLS: Primary | ICD-10-CM

## 2023-01-28 RX ORDER — NORETHINDRONE AND ETHINYL ESTRADIOL 1 MG-35MCG
1 KIT ORAL DAILY
Qty: 84 TABLET | Refills: 1 | Status: SHIPPED | OUTPATIENT
Start: 2023-01-28

## 2023-01-28 RX ORDER — DOXYCYCLINE HYCLATE 50 MG/1
50 CAPSULE ORAL 2 TIMES DAILY
Qty: 180 CAPSULE | Refills: 0 | Status: SHIPPED | OUTPATIENT
Start: 2023-01-28

## 2023-03-06 ENCOUNTER — TELEPHONE (OUTPATIENT)
Dept: FAMILY MEDICINE CLINIC | Facility: CLINIC | Age: 33
End: 2023-03-06

## 2023-04-26 ENCOUNTER — PATIENT OUTREACH (OUTPATIENT)
Dept: CASE MANAGEMENT | Age: 33
End: 2023-04-26

## 2023-04-26 NOTE — PROCEDURES
The office order for PCP removal request is Approved and finalized on April 26, 2023.     Thanks,  [facility] [employer]

## 2024-02-04 NOTE — TELEPHONE ENCOUNTER
A refill request was received for:  Requested Prescriptions     Pending Prescriptions Disp Refills   • [facility] 1/35 1-35 MG-MCG Oral Tab [Pharmacy Med Name: [facility] 1/35 TAB] 84 tablet 1     Sig: TAKE 1 TABLET BY MOUTH ONCE DAILY     Last refill date: 4/
Called pt to remind her that she is due for follow up with Dr Rao Quiroz. Talked to her mom. She will forward the message to her daughter.
No

## 2024-05-29 DIAGNOSIS — L71.9 ROSACEA: ICD-10-CM

## 2024-05-29 NOTE — TELEPHONE ENCOUNTER
A refill request was received for:  Requested Prescriptions     Pending Prescriptions Disp Refills    DOXYCYCLINE 50 MG Oral Cap [Pharmacy Med Name: Doxycycline Hyclate 50 MG Oral Capsule] 180 capsule 0     Sig: Take 1 capsule by mouth twice daily     Last refill date:  01/28/2023  Qty: 180  Dx:   Dyshidrotic dermatitis   Last office visit: 01/28/2024   When is follow up due: 6 months      Please schedule follow up for RX refill     No future appointments.

## 2024-06-12 NOTE — TELEPHONE ENCOUNTER
Not able to reach patient. Mobile phone# on file was answered by a gentlemen who stated that our office has the wrong phone # and proceeded to discontinue the call. Home phone# is not a working number, only received a busy signal.

## 2024-06-13 RX ORDER — DOXYCYCLINE HYCLATE 50 MG/1
50 CAPSULE ORAL 2 TIMES DAILY
Qty: 180 CAPSULE | Refills: 0 | OUTPATIENT
Start: 2024-06-13

## (undated) DIAGNOSIS — Z00.00 ROUTINE MEDICAL EXAM: ICD-10-CM

## (undated) DIAGNOSIS — L71.9 ROSACEA: ICD-10-CM

## (undated) NOTE — LETTER
08/28/20        [facility],     Below are the results of your recently performed labs/tests: All results are essentially stable or within NORMAL limits for you. Please:   Recheck as below:    In 1 year with your next annual physical exam.